# Patient Record
(demographics unavailable — no encounter records)

---

## 2025-02-14 NOTE — PHYSICAL EXAM
[de-identified] : General: Well-nourished, well-developed, alert, and in no acute distress. Head: Normocephalic. Eyes: Pupils equal, extraocular muscles intact, normal sclera. Nose: No nasal discharge. Cardiovascular: Extremities are warm and well perfused. Respiratory: No labored breathing. Extremities: Sensation is intact distally bilaterally.  Lymphatic: No regional lymphadenopathy, no lymphedema Neurologic: No focal deficits Skin: Normal skin color, texture, and turgor Psychiatric: Normal affect  MSK: Examination of the Lumbar Spine: Gait normal Ambulating independently  Able to toe walk, heel walk  AROM: forward flexion, extension full and pain-free Tender to palpation: bilateral GTB Nontender to palpation: midline, paraspinals, SI jt, piriformis, gluteals   Lumbar Facet Loading [negative]     Right hip Range of Motion: Internal rotation: [10] degrees, External rotation: [60] degrees, Flexion [100] degrees     Log roll negative ELLIOTT negative FADIR negative   SLR negative. Tight Hamstrings Piriformis compression negative ASIS distraction negative Iliac compression negative   Left hip:   Range of Motion: Internal rotation: [15] degrees, External rotation: [60] degrees, Flexion [110] degrees   Special tests: ELLIOTT negative FADIR negative   SLR negative. Tight Hamstrings Piriformis compression negative ASIS distraction negative Iliac compression negative     Sensation is intact to light touch over the superficial and deep peroneal nerve distributions and the posterior tibial nerve distribution. Capillary refill is less than two seconds. Posterior tibial and dorsalis pedis pulses 2+ equal bilaterally. No calf swelling or tenderness bilaterally. Strength testing shows Hip flexion 5/5, Hip adduction 5/5, Hip abduction 5/5, Knee Extension 5/5, Knee Flexion 5/5, dorsiflexion 5/5, plantar flexion 5/5, EHL 5/5 Reflexes: Patellar 2+, Achilles 2+.     [de-identified] : Date: 02/12/2025 Location: [facility] Body part: XRAY B/L HIP independently reviewed and interpreted by me. Impression: No evidence of fracture or dislocation. Mild femoroacetabular joint space narrowing and subchondral sclerosis of the acetabulum bilaterally. Moderate degenerative changes of the lumbar spine are possible.

## 2025-02-14 NOTE — ADDENDUM
[FreeTextEntry1] : I, Sue Yousif ([facility]) assisted in filling out this chart under the dictation of Angi Ross on 02/12/2025 .

## 2025-02-14 NOTE — PHYSICAL EXAM
[de-identified] : General: Well-nourished, well-developed, alert, and in no acute distress. Head: Normocephalic. Eyes: Pupils equal, extraocular muscles intact, normal sclera. Nose: No nasal discharge. Cardiovascular: Extremities are warm and well perfused. Respiratory: No labored breathing. Extremities: Sensation is intact distally bilaterally.  Lymphatic: No regional lymphadenopathy, no lymphedema Neurologic: No focal deficits Skin: Normal skin color, texture, and turgor Psychiatric: Normal affect  MSK: Examination of the Lumbar Spine: Gait normal Ambulating independently  Able to toe walk, heel walk  AROM: forward flexion, extension full and pain-free Tender to palpation: bilateral GTB Nontender to palpation: midline, paraspinals, SI jt, piriformis, gluteals   Lumbar Facet Loading [negative]     Right hip Range of Motion: Internal rotation: [10] degrees, External rotation: [60] degrees, Flexion [100] degrees     Log roll negative ELLIOTT negative FADIR negative   SLR negative. Tight Hamstrings Piriformis compression negative ASIS distraction negative Iliac compression negative   Left hip:   Range of Motion: Internal rotation: [15] degrees, External rotation: [60] degrees, Flexion [110] degrees   Special tests: ELLIOTT negative FADIR negative   SLR negative. Tight Hamstrings Piriformis compression negative ASIS distraction negative Iliac compression negative     Sensation is intact to light touch over the superficial and deep peroneal nerve distributions and the posterior tibial nerve distribution. Capillary refill is less than two seconds. Posterior tibial and dorsalis pedis pulses 2+ equal bilaterally. No calf swelling or tenderness bilaterally. Strength testing shows Hip flexion 5/5, Hip adduction 5/5, Hip abduction 5/5, Knee Extension 5/5, Knee Flexion 5/5, dorsiflexion 5/5, plantar flexion 5/5, EHL 5/5 Reflexes: Patellar 2+, Achilles 2+.     [de-identified] : Date: 02/12/2025 Location: [facility] Body part: XRAY B/L HIP independently reviewed and interpreted by me. Impression: No evidence of fracture or dislocation. Mild femoroacetabular joint space narrowing and subchondral sclerosis of the acetabulum bilaterally. Moderate degenerative changes of the lumbar spine are possible.

## 2025-02-14 NOTE — HISTORY OF PRESENT ILLNESS
[de-identified] : LYNSEY GRANT is an 82-year-old male with right hip groin pain. The patient reports the discomfort began a few months ago, primarily occurring during driving, especially when shifting between the accelerator and brake. The discomfort is described as significant enough to cause the patient to shift in the seat while driving. The patient has curtailed longer drives due to this discomfort. Pt. denies any numbness or clicking down the leg. The patient denies changes in bladder or bowel function. He took Aleve. The patient has a history of two hernia operations and two stents placed 15 years ago, which have held well. He did noticed mild bulging on the right side and then on the left of the hernias. 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 Pt. visits the urologist regularly and is currently taking Flomax for a mildly enlarged prostate.

## 2025-02-14 NOTE — ADDENDUM
[FreeTextEntry1] : I, Sue Yousif ([facility]) assisted in filling out this chart under the dictation of Angi Ross on 02/12/2025 .

## 2025-02-14 NOTE — DISCUSSION/SUMMARY

## 2025-02-14 NOTE — HISTORY OF PRESENT ILLNESS
[de-identified] : LYNSEY GRANT is an 82-year-old male with right hip groin pain. The patient reports the discomfort began a few months ago, primarily occurring during driving, especially when shifting between the accelerator and brake. The discomfort is described as significant enough to cause the patient to shift in the seat while driving. The patient has curtailed longer drives due to this discomfort. Pt. denies any numbness or clicking down the leg. The patient denies changes in bladder or bowel function. He took Aleve. The patient has a history of two hernia operations and two stents placed 15 years ago, which have held well. He did noticed mild bulging on the right side and then on the left of the hernias. 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 Pt. visits the urologist regularly and is currently taking Flomax for a mildly enlarged prostate.

## 2025-02-14 NOTE — ASSESSMENT
[FreeTextEntry1] : LYNSEY GRANT is an 82-year-old male with right hip pain. I discussed with the patient that their symptoms, signs, and imaging are most consistent with osteoarthritis, hamstring, and hip flexor tightness, and greater trochanteric pain syndrome. Patient also has right elbow swelling consistent with olecranon bursitis (aseptic).  We reviewed the natural history of this condition and treatment options.  We agreed on the following plan:  XR bilateral hips taken and reviewed with the patient today. Activity modification Daily stretching advised. Start Home Exercises for hip and spine conditioning. Demonstration and handout provided.  Physical therapy. Referral provided. Medication: Consider magnesium-based topical therapy for cramping at night. Encourage adequate hydration to prevent cramps at night. Advanced imaging: consider MRI if worsen. Obtain lumbar spine X-ray at next visit if symptoms remain. Advise to observe the right elbow and avoid pressure over the area. Follow up in 8 weeks.

## 2025-02-14 NOTE — END OF VISIT
[FreeTextEntry3] : Documented by Sue Yousif acting as a scribe for Angi Ross on 02/12/2025   All medical record entries made by the Scribe were at my, Dr. Angi Ross direction and personally dictated by me on 02/12/2025 . I have reviewed the chart and agree that the record accurately reflects my personal performance of the history, physical exam, assessment and plan. I have also personally directed, reviewed, and agreed with the chart. [Time Spent: ___ minutes] : I have spent [unfilled] minutes of time on the encounter which excludes teaching and separately reported services.

## 2025-02-14 NOTE — DISCUSSION/SUMMARY
